# Patient Record
Sex: MALE | Race: WHITE | NOT HISPANIC OR LATINO | Employment: OTHER | URBAN - METROPOLITAN AREA
[De-identification: names, ages, dates, MRNs, and addresses within clinical notes are randomized per-mention and may not be internally consistent; named-entity substitution may affect disease eponyms.]

---

## 2018-08-26 ENCOUNTER — APPOINTMENT (OUTPATIENT)
Dept: CT IMAGING | Facility: HOSPITAL | Age: 73
End: 2018-08-26

## 2018-08-26 ENCOUNTER — APPOINTMENT (OUTPATIENT)
Dept: MRI IMAGING | Facility: HOSPITAL | Age: 73
End: 2018-08-26

## 2018-08-26 ENCOUNTER — HOSPITAL ENCOUNTER (EMERGENCY)
Facility: HOSPITAL | Age: 73
Discharge: HOME | End: 2018-08-26
Attending: EMERGENCY MEDICINE | Admitting: EMERGENCY MEDICINE

## 2018-08-26 VITALS
OXYGEN SATURATION: 93 % | RESPIRATION RATE: 16 BRPM | DIASTOLIC BLOOD PRESSURE: 72 MMHG | BODY MASS INDEX: 23.1 KG/M2 | HEIGHT: 74 IN | SYSTOLIC BLOOD PRESSURE: 132 MMHG | HEART RATE: 61 BPM | TEMPERATURE: 98.5 F | WEIGHT: 180 LBS

## 2018-08-26 DIAGNOSIS — R42 DIZZINESS: ICD-10-CM

## 2018-08-26 DIAGNOSIS — Z98.2 S/P VP SHUNT: ICD-10-CM

## 2018-08-26 DIAGNOSIS — Z86.79 HISTORY OF CORONARY ARTERY DISEASE: ICD-10-CM

## 2018-08-26 DIAGNOSIS — I10 ELEVATED BLOOD PRESSURE READING WITH DIAGNOSIS OF HYPERTENSION: ICD-10-CM

## 2018-08-26 DIAGNOSIS — R42 VERTIGO: Primary | ICD-10-CM

## 2018-08-26 LAB
ALBUMIN SERPL-MCNC: 4.43 G/DL (ref 3.2–4.8)
ALBUMIN/GLOB SERPL: 2 G/DL (ref 1.5–2.5)
ALP SERPL-CCNC: 34 U/L (ref 25–100)
ALT SERPL W P-5'-P-CCNC: 26 U/L (ref 7–40)
ANION GAP SERPL CALCULATED.3IONS-SCNC: 6 MMOL/L (ref 3–11)
AST SERPL-CCNC: 27 U/L (ref 0–33)
BASOPHILS # BLD AUTO: 0.04 10*3/MM3 (ref 0–0.2)
BASOPHILS NFR BLD AUTO: 0.4 % (ref 0–1)
BILIRUB SERPL-MCNC: 0.7 MG/DL (ref 0.3–1.2)
BILIRUB UR QL STRIP: NEGATIVE
BNP SERPL-MCNC: 101 PG/ML (ref 0–100)
BUN BLD-MCNC: 17 MG/DL (ref 9–23)
BUN/CREAT SERPL: 14.4 (ref 7–25)
CALCIUM SPEC-SCNC: 10 MG/DL (ref 8.7–10.4)
CHLORIDE SERPL-SCNC: 107 MMOL/L (ref 99–109)
CLARITY UR: CLEAR
CO2 SERPL-SCNC: 24 MMOL/L (ref 20–31)
COLOR UR: YELLOW
CREAT BLD-MCNC: 1.18 MG/DL (ref 0.6–1.3)
DEPRECATED RDW RBC AUTO: 50.1 FL (ref 37–54)
EOSINOPHIL # BLD AUTO: 0.11 10*3/MM3 (ref 0–0.3)
EOSINOPHIL NFR BLD AUTO: 1.1 % (ref 0–3)
ERYTHROCYTE [DISTWIDTH] IN BLOOD BY AUTOMATED COUNT: 13.3 % (ref 11.3–14.5)
GFR SERPL CREATININE-BSD FRML MDRD: 60 ML/MIN/1.73
GLOBULIN UR ELPH-MCNC: 2.3 GM/DL
GLUCOSE BLD-MCNC: 108 MG/DL (ref 70–100)
GLUCOSE UR STRIP-MCNC: NEGATIVE MG/DL
HCT VFR BLD AUTO: 40.1 % (ref 38.9–50.9)
HGB BLD-MCNC: 13.7 G/DL (ref 13.1–17.5)
HGB UR QL STRIP.AUTO: NEGATIVE
IMM GRANULOCYTES # BLD: 0.03 10*3/MM3 (ref 0–0.03)
IMM GRANULOCYTES NFR BLD: 0.3 % (ref 0–0.6)
KETONES UR QL STRIP: NEGATIVE
LEUKOCYTE ESTERASE UR QL STRIP.AUTO: NEGATIVE
LYMPHOCYTES # BLD AUTO: 1.88 10*3/MM3 (ref 0.6–4.8)
LYMPHOCYTES NFR BLD AUTO: 19.3 % (ref 24–44)
MCH RBC QN AUTO: 34.8 PG (ref 27–31)
MCHC RBC AUTO-ENTMCNC: 34.2 G/DL (ref 32–36)
MCV RBC AUTO: 101.8 FL (ref 80–99)
MONOCYTES # BLD AUTO: 1.07 10*3/MM3 (ref 0–1)
MONOCYTES NFR BLD AUTO: 11 % (ref 0–12)
NEUTROPHILS # BLD AUTO: 6.63 10*3/MM3 (ref 1.5–8.3)
NEUTROPHILS NFR BLD AUTO: 68.2 % (ref 41–71)
NITRITE UR QL STRIP: NEGATIVE
PH UR STRIP.AUTO: 7.5 [PH] (ref 5–8)
PLATELET # BLD AUTO: 207 10*3/MM3 (ref 150–450)
PMV BLD AUTO: 9.3 FL (ref 6–12)
POTASSIUM BLD-SCNC: 4.4 MMOL/L (ref 3.5–5.5)
PROT SERPL-MCNC: 6.7 G/DL (ref 5.7–8.2)
PROT UR QL STRIP: NEGATIVE
RBC # BLD AUTO: 3.94 10*6/MM3 (ref 4.2–5.76)
SODIUM BLD-SCNC: 137 MMOL/L (ref 132–146)
SP GR UR STRIP: 1.01 (ref 1–1.03)
TROPONIN I SERPL-MCNC: 0 NG/ML (ref 0–0.07)
UROBILINOGEN UR QL STRIP: NORMAL
WBC NRBC COR # BLD: 9.73 10*3/MM3 (ref 3.5–10.8)

## 2018-08-26 PROCEDURE — 99284 EMERGENCY DEPT VISIT MOD MDM: CPT

## 2018-08-26 PROCEDURE — 93005 ELECTROCARDIOGRAM TRACING: CPT

## 2018-08-26 PROCEDURE — 81003 URINALYSIS AUTO W/O SCOPE: CPT | Performed by: EMERGENCY MEDICINE

## 2018-08-26 PROCEDURE — 83880 ASSAY OF NATRIURETIC PEPTIDE: CPT | Performed by: EMERGENCY MEDICINE

## 2018-08-26 PROCEDURE — 70450 CT HEAD/BRAIN W/O DYE: CPT

## 2018-08-26 PROCEDURE — 70544 MR ANGIOGRAPHY HEAD W/O DYE: CPT

## 2018-08-26 PROCEDURE — 80053 COMPREHEN METABOLIC PANEL: CPT | Performed by: EMERGENCY MEDICINE

## 2018-08-26 PROCEDURE — 96374 THER/PROPH/DIAG INJ IV PUSH: CPT

## 2018-08-26 PROCEDURE — 85025 COMPLETE CBC W/AUTO DIFF WBC: CPT | Performed by: EMERGENCY MEDICINE

## 2018-08-26 PROCEDURE — 84484 ASSAY OF TROPONIN QUANT: CPT

## 2018-08-26 PROCEDURE — 93005 ELECTROCARDIOGRAM TRACING: CPT | Performed by: EMERGENCY MEDICINE

## 2018-08-26 PROCEDURE — 70548 MR ANGIOGRAPHY NECK W/DYE: CPT

## 2018-08-26 PROCEDURE — 70551 MRI BRAIN STEM W/O DYE: CPT

## 2018-08-26 PROCEDURE — A9577 INJ MULTIHANCE: HCPCS | Performed by: EMERGENCY MEDICINE

## 2018-08-26 PROCEDURE — 25010000002 LORAZEPAM PER 2 MG: Performed by: EMERGENCY MEDICINE

## 2018-08-26 PROCEDURE — 0 GADOBENATE DIMEGLUMINE 529 MG/ML SOLUTION: Performed by: EMERGENCY MEDICINE

## 2018-08-26 RX ORDER — NITROGLYCERIN 0.4 MG/1
0.4 TABLET SUBLINGUAL
COMMUNITY

## 2018-08-26 RX ORDER — LOSARTAN POTASSIUM 50 MG/1
100 TABLET ORAL DAILY
COMMUNITY

## 2018-08-26 RX ORDER — METOPROLOL TARTRATE 100 MG/1
100 TABLET ORAL 2 TIMES DAILY
COMMUNITY

## 2018-08-26 RX ORDER — CYCLOSPORINE 0.5 MG/ML
1 EMULSION OPHTHALMIC 2 TIMES DAILY
COMMUNITY

## 2018-08-26 RX ORDER — SILODOSIN 8 MG/1
CAPSULE ORAL
COMMUNITY

## 2018-08-26 RX ORDER — LORAZEPAM 2 MG/ML
1 INJECTION INTRAMUSCULAR ONCE
Status: COMPLETED | OUTPATIENT
Start: 2018-08-26 | End: 2018-08-26

## 2018-08-26 RX ORDER — POTASSIUM CHLORIDE 750 MG/1
10 TABLET, FILM COATED, EXTENDED RELEASE ORAL 2 TIMES DAILY
COMMUNITY

## 2018-08-26 RX ORDER — VITAMIN E 268 MG
400 CAPSULE ORAL DAILY
COMMUNITY

## 2018-08-26 RX ORDER — ASPIRIN 325 MG
325 TABLET ORAL DAILY
COMMUNITY

## 2018-08-26 RX ORDER — NIACIN 1000 MG/1
1000 TABLET, EXTENDED RELEASE ORAL NIGHTLY
COMMUNITY

## 2018-08-26 RX ORDER — LANSOPRAZOLE 30 MG/1
30 CAPSULE, DELAYED RELEASE ORAL DAILY
COMMUNITY

## 2018-08-26 RX ORDER — LORATADINE 10 MG/1
CAPSULE, LIQUID FILLED ORAL
COMMUNITY

## 2018-08-26 RX ORDER — SIMVASTATIN 40 MG
40 TABLET ORAL NIGHTLY
COMMUNITY

## 2018-08-26 RX ORDER — OMEGA-3-ACID ETHYL ESTERS 1 G/1
2 CAPSULE, LIQUID FILLED ORAL 2 TIMES DAILY
COMMUNITY

## 2018-08-26 RX ORDER — TRIAMTERENE AND HYDROCHLOROTHIAZIDE 75; 50 MG/1; MG/1
1 TABLET ORAL DAILY
COMMUNITY

## 2018-08-26 RX ORDER — EZETIMIBE 10 MG/1
10 TABLET ORAL DAILY
COMMUNITY

## 2018-08-26 RX ORDER — ALBUTEROL SULFATE 90 UG/1
2 AEROSOL, METERED RESPIRATORY (INHALATION) EVERY 4 HOURS PRN
COMMUNITY

## 2018-08-26 RX ORDER — MECLIZINE HYDROCHLORIDE 25 MG/1
25 TABLET ORAL 3 TIMES DAILY PRN
Qty: 21 TABLET | Refills: 0 | Status: SHIPPED | OUTPATIENT
Start: 2018-08-26

## 2018-08-26 RX ORDER — SODIUM CHLORIDE 0.9 % (FLUSH) 0.9 %
10 SYRINGE (ML) INJECTION AS NEEDED
Status: DISCONTINUED | OUTPATIENT
Start: 2018-08-26 | End: 2018-08-26 | Stop reason: HOSPADM

## 2018-08-26 RX ORDER — FINASTERIDE 5 MG/1
5 TABLET, FILM COATED ORAL DAILY
COMMUNITY

## 2018-08-26 RX ADMIN — LORAZEPAM 1 MG: 2 INJECTION INTRAMUSCULAR; INTRAVENOUS at 15:37

## 2018-08-26 RX ADMIN — GADOBENATE DIMEGLUMINE 15 ML: 529 INJECTION, SOLUTION INTRAVENOUS at 16:20

## 2018-08-28 ENCOUNTER — TELEPHONE (OUTPATIENT)
Dept: NEUROSURGERY | Facility: CLINIC | Age: 73
End: 2018-08-28

## 2018-08-28 NOTE — TELEPHONE ENCOUNTER
Provider:  Huseyin  Caller: Regis Bravo  Time of call:   9:35   Phone #:  [phone]  Surgery:  Shunt placement  Surgery Date:  10/08/12  Last visit:   02/27/15  Next visit: none scheduled    Reason for call:       (See alexia's message below)

## 2018-08-28 NOTE — TELEPHONE ENCOUNTER
Per the scan ordering history in EPIC they were ordered by Dr. Bernal (they are in EPIC).     Per ER notes they are sending the patient back to us just to check shunt settings, he can be scheduled with a PA

## 2018-08-28 NOTE — TELEPHONE ENCOUNTER
Pt called in to schedule a post ER visit with us.  It looks like we put in his Shunt in 2013 and they wanted to make sure that was evaluated after the MRI/MRA they did over the weekend.  Should the patient be seen in the office by a PA on a day that a physician is in the office?  Do we need to evaluate his brain scans as well?  Please advise.

## 2018-08-28 NOTE — TELEPHONE ENCOUNTER
Pt is aware to arrive tomorrow at 10am to fill out paperwork for us.  He is on your schedule at 11:00.  He asked about the scans and if you needed to review them.  Just a heads up.

## 2018-08-28 NOTE — TELEPHONE ENCOUNTER
Please make sure we have all patients appropriate prior surgical history/notes before his appt tomorrow.  It is still not clear why his current studies were ordered and if he was referred back to us for review and further recommendation. If someone else is following him with his studies and this appointment is just to check his shunt, that is ok.  We will still need previous documentation of previous shunt settings.

## 2018-08-28 NOTE — TELEPHONE ENCOUNTER
Do you know who ordered the new scans?     If new scans were ordered by our office, or referral to be seen by our office for new symptoms - then yes, he needs to come back to be seen while there is a physician present.     - however, just to check the shunt, he can be seen in the office by a PA.     Thanks,     SO

## 2018-08-29 ENCOUNTER — OFFICE VISIT (OUTPATIENT)
Dept: NEUROSURGERY | Facility: CLINIC | Age: 73
End: 2018-08-29

## 2018-08-29 VITALS
TEMPERATURE: 97.8 F | WEIGHT: 183.2 LBS | DIASTOLIC BLOOD PRESSURE: 74 MMHG | BODY MASS INDEX: 24.28 KG/M2 | HEIGHT: 73 IN | SYSTOLIC BLOOD PRESSURE: 110 MMHG

## 2018-08-29 DIAGNOSIS — G91.2 NORMAL PRESSURE HYDROCEPHALUS (HCC): ICD-10-CM

## 2018-08-29 DIAGNOSIS — G93.0 ARACHNOID CYST: ICD-10-CM

## 2018-08-29 DIAGNOSIS — Z98.2 VENTRICULOPLEURAL SHUNT STATUS: Primary | ICD-10-CM

## 2018-08-29 PROCEDURE — 99203 OFFICE O/P NEW LOW 30 MIN: CPT | Performed by: PHYSICIAN ASSISTANT

## 2018-08-29 NOTE — PROGRESS NOTES
Patient: Regis Bravo  : 1945  Chart #: [id number]    Date of Service: 2018    CHIEF COMPLAINT: Shunt check     History of Present Illness Mr. Bravo is a 73-year-old gentleman who is well known to our clinic.  He has a history of right craniotomy for fenestration of a very large arachnoid cyst with placement of blind ended ventricular catheter in the arachnoid cyst bed on 2011.  Postoperatively he developed some further gait difficulties and was felt to harbor a syndrome of normal pressure hydrocephalus.  On 10/8/2012 he underwent conversion of the blind-ended Vish reservoir into the arachnoid cyst to peritoneal shunt.  A Strata II valve was utilized and initially set at 1.5. Ultimately it was lowered to 0.5.  He has chronic gait difficulties.  Over the weekend he presented to the ED with complaints of dizziness. He said he felt like the room was spinning.  He had some MRIs and is here to have his shunt checked following the MRI.  In the ED he was treated with antivert and report doing much better. He denies dizziness today. No changes to his gait, focal weakness, confusion, or headache.  He says he has been doing very well.       Past Medical History:   Diagnosis Date   • Arachnoid cyst    • COPD (chronic obstructive pulmonary disease) (CMS/[facility])    • Coronary artery disease    • Hyperlipidemia    • Hypertension        Past Surgical History:   Procedure Laterality Date   • BRAIN SURGERY     • CARDIAC SURGERY         Social History     Social History   • Marital status:      Spouse name: N/A   • Number of children: N/A   • Years of education: N/A     Occupational History   • Not on file.     Social History Main Topics   • Smoking status: Current Every Day Smoker     Packs/day: 2.00     Types: Cigarettes   • Smokeless tobacco: Never Used   • Alcohol use 12.6 oz/week     21 Shots of liquor per week   • Drug use: No   • Sexual activity: Defer     Other Topics Concern   • Not on file      Social History Narrative   • No narrative on file         Current Outpatient Prescriptions:   •  albuterol (PROVENTIL HFA;VENTOLIN HFA) 108 (90 Base) MCG/ACT inhaler, Inhale 2 puffs Every 4 (Four) Hours As Needed for Wheezing., Disp: , Rfl:   •  aspirin 325 MG tablet, Take 325 mg by mouth Daily., Disp: , Rfl:   •  CALCIUM-VITAMIN D PO, Calcium-Vitamin D, Disp: , Rfl:   •  cycloSPORINE (RESTASIS) 0.05 % ophthalmic emulsion, 1 drop 2 (Two) Times a Day., Disp: , Rfl:   •  ezetimibe (ZETIA) 10 MG tablet, Take 10 mg by mouth Daily., Disp: , Rfl:   •  finasteride (PROSCAR) 5 MG tablet, Take 5 mg by mouth Daily., Disp: , Rfl:   •  FLUTICASONE PROPIONATE NA, fluticasone, Disp: , Rfl:   •  INDOMETHACIN PO, Take  by mouth., Disp: , Rfl:   •  lansoprazole (PREVACID) 30 MG capsule, Take 30 mg by mouth Daily., Disp: , Rfl:   •  Loratadine 10 MG capsule, Take  by mouth., Disp: , Rfl:   •  losartan (COZAAR) 50 MG tablet, Take 100 mg by mouth Daily., Disp: , Rfl:   •  meclizine (ANTIVERT) 25 MG tablet, Take 1 tablet by mouth 3 (Three) Times a Day As Needed for dizziness., Disp: 21 tablet, Rfl: 0  •  metoprolol tartrate (LOPRESSOR) 100 MG tablet, Take 100 mg by mouth 2 (Two) Times a Day., Disp: , Rfl:   •  Multiple Vitamins-Minerals (CENTRUM SILVER PO), Centrum Silver 0.4 mg-300 mcg-250 mcg tablet, Disp: , Rfl:   •  niacin (NIASPAN) 1000 MG CR tablet, Take 1,000 mg by mouth Every Night., Disp: , Rfl:   •  nitroglycerin (NITROSTAT) 0.4 MG SL tablet, Place 0.4 mg under the tongue Every 5 (Five) Minutes As Needed for Chest Pain. Take no more than 3 doses in 15 minutes., Disp: , Rfl:   •  omega-3 acid ethyl esters (LOVAZA) 1 g capsule, Take 2 g by mouth 2 (Two) Times a Day., Disp: , Rfl:   •  potassium chloride (K-DUR) 10 MEQ CR tablet, Take 10 mEq by mouth 2 (Two) Times a Day., Disp: , Rfl:   •  silodosin (RAPAFLO) 8 MG capsule capsule, Take  by mouth Daily With Breakfast., Disp: , Rfl:   •  simvastatin (ZOCOR) 40 MG tablet, Take  40 mg by mouth Every Night., Disp: , Rfl:   •  tiotropium (SPIRIVA) 18 MCG per inhalation capsule, Place 1 capsule into inhaler and inhale Daily., Disp: , Rfl:   •  triamterene-hydrochlorothiazide (MAXZIDE) 75-50 MG per tablet, Take 1 tablet by mouth Daily., Disp: , Rfl:   •  vitamin E 400 UNIT capsule, Take 400 Units by mouth Daily., Disp: , Rfl:         Review of Systems   Constitutional: Negative for activity change, appetite change, chills, diaphoresis, fatigue, fever and unexpected weight change.   HENT: Negative for congestion, dental problem, drooling, ear discharge, ear pain, facial swelling, hearing loss, mouth sores, nosebleeds, postnasal drip, rhinorrhea, sinus pressure, sneezing, sore throat, tinnitus, trouble swallowing and voice change.    Eyes: Negative for photophobia, pain, discharge, redness, itching and visual disturbance.   Respiratory: Negative for apnea, cough, choking, chest tightness, shortness of breath, wheezing and stridor.    Cardiovascular: Negative for chest pain, palpitations and leg swelling.   Gastrointestinal: Negative for abdominal distention, abdominal pain, anal bleeding, blood in stool, constipation, diarrhea, nausea, rectal pain and vomiting.   Endocrine: Negative for cold intolerance, heat intolerance, polydipsia, polyphagia and polyuria.   Genitourinary: Negative for decreased urine volume, difficulty urinating, dysuria, enuresis, flank pain, frequency, genital sores, hematuria and urgency.   Musculoskeletal: Negative for arthralgias, back pain, gait problem, joint swelling, myalgias, neck pain and neck stiffness.   Skin: Negative for color change, pallor, rash and wound.   Allergic/Immunologic: Negative for environmental allergies, food allergies and immunocompromised state.   Neurological: Positive for dizziness and light-headedness. Negative for tremors, seizures, syncope, facial asymmetry, speech difficulty, weakness, numbness and headaches.   Hematological: Negative for  "adenopathy. Does not bruise/bleed easily.   Psychiatric/Behavioral: Negative for agitation, behavioral problems, confusion, decreased concentration, dysphoric mood, hallucinations, self-injury, sleep disturbance and suicidal ideas. The patient is not nervous/anxious and is not hyperactive.    All other systems reviewed and are negative.      Objective   Vital Signs: Blood pressure 110/74, temperature 97.8 °F (36.6 °C), temperature source Temporal Artery , height 185.4 cm (73\"), weight 83.1 kg (183 lb 3.2 oz).  Physical Exam   Constitutional: He appears well-developed and well-nourished. No distress.   HENT:   Head: Normocephalic and atraumatic.   Eyes: Pupils are equal, round, and reactive to light. EOM are normal.   Cardiovascular: Normal rate and regular rhythm.    Pulmonary/Chest: Effort normal and breath sounds normal.   Psychiatric: He has a normal mood and affect. His behavior is normal. Thought content normal.   Nursing note and vitals reviewed.  Musculoskeletal:  Strength is intact in upper and lower extremities to direct testing.  Patient's gait is slightly shuffling. He uses a walker for assistance. He denies acute changes.   Neurologic:  Muscle tone is normal throughout.  Coordination is intact.  Sensation is intact to light touch throughout.  Patient is oriented to person, place, and time.  CRANIAL NERVES:  Cranial nerve II: Visual acuity intact.  Visual fields are intact.  Cranial nerves III, IV, and VI: Extraocular movements are intact without nystagmus.  Pupils are equal, round, and reactive to light and accommodation.  The eyelids elevate normal equal bilaterally.  Cranial nerve V: Jaw clenching is intact bilaterally.  Facial sensation is intact to light touch.   Cranial nerve VII: There is no facial asymmetry to direct testing of the muscles of facial expression.  Cranial nerve VIII: Hearing intact to finger rub bilaterally.  Balance is intact.  Cranial nerve IX and X: Uvula is midline.  The soft " palate rises symmetrically.  Voice is intact without hoarseness.   Cranial nerve XI: Shoulder shrug is intact.  Cranial nerve XII: Tongue is midline without atrophy or fasciculation.    Shunt was interrogated and pumps and refills with ease. It continues to be set at 0.5    Assessment/Plan    Diagnosis:  1. Normal pressure hydrocephalus  2. Arachnoid cyst    Medical Decision Making: Shunt setting is unchanged. Mr. Bravo is doing well. I will ask Dr. Mitchell to review his recent studies and I will call patient.  He may call our office if he has new or worsening symptoms.      There are no diagnoses linked to this encounter.           Lida Moore PA-C  Patient Care Team:  Dawood Cohen III, MD as PCP - General (Family Medicine)

## 2021-10-29 ENCOUNTER — TRANSCRIBE ORDERS (OUTPATIENT)
Dept: ADMINISTRATIVE | Facility: HOSPITAL | Age: 76
End: 2021-10-29

## 2021-10-29 DIAGNOSIS — R42 DIZZINESS AND GIDDINESS: Primary | ICD-10-CM

## 2021-11-19 ENCOUNTER — HOSPITAL ENCOUNTER (OUTPATIENT)
Dept: MRI IMAGING | Facility: HOSPITAL | Age: 76
Discharge: HOME | End: 2021-11-19
Admitting: FAMILY MEDICINE

## 2021-11-19 DIAGNOSIS — R42 DIZZINESS AND GIDDINESS: ICD-10-CM

## 2021-11-19 PROCEDURE — 70551 MRI BRAIN STEM W/O DYE: CPT

## 2022-02-09 ENCOUNTER — OFFICE VISIT (OUTPATIENT)
Dept: NEUROSURGERY | Facility: CLINIC | Age: 77
End: 2022-02-09

## 2022-02-09 VITALS — WEIGHT: 188.2 LBS | TEMPERATURE: 97.3 F | HEIGHT: 73 IN | BODY MASS INDEX: 24.94 KG/M2

## 2022-02-09 DIAGNOSIS — G91.2 NORMAL PRESSURE HYDROCEPHALUS: ICD-10-CM

## 2022-02-09 DIAGNOSIS — G93.0 ARACHNOID CYST: Primary | ICD-10-CM

## 2022-02-09 PROBLEM — Z72.0 TOBACCO USER: Status: ACTIVE | Noted: 2018-05-31

## 2022-02-09 PROBLEM — R97.20 RAISED PROSTATE SPECIFIC ANTIGEN: Status: ACTIVE | Noted: 2017-05-22

## 2022-02-09 PROBLEM — R10.9 RIGHT SIDED ABDOMINAL PAIN: Status: ACTIVE | Noted: 2020-03-05

## 2022-02-09 PROBLEM — E29.1 MALE HYPOGONADISM: Status: ACTIVE | Noted: 2020-10-08

## 2022-02-09 PROBLEM — I25.10 CORONARY ARTERIOSCLEROSIS: Status: ACTIVE | Noted: 2020-10-08

## 2022-02-09 PROBLEM — C61 ADENOCARCINOMA OF PROSTATE (HCC): Status: ACTIVE | Noted: 2021-06-17

## 2022-02-09 PROCEDURE — 99204 OFFICE O/P NEW MOD 45 MIN: CPT | Performed by: NEUROLOGICAL SURGERY

## 2022-02-09 RX ORDER — METHOCARBAMOL 500 MG/1
500 TABLET, FILM COATED ORAL 4 TIMES DAILY
COMMUNITY

## 2022-02-09 RX ORDER — HYDROCODONE BITARTRATE AND ACETAMINOPHEN 7.5; 325 MG/1; MG/1
1 TABLET ORAL DAILY
COMMUNITY
Start: 2022-01-25

## 2022-02-09 NOTE — PROGRESS NOTES
Patient: Regis Bravo  : 1945    Primary Care Provider: Dawood Cohen III, MD    Requesting Provider: As above        History    Chief Complaint: Dizziness and unsteady gait.    History of Present Illness: Mr. Bravo is a 76-year-old gentleman is well-known to my service.  He is known to have a huge frontotemporal arachnoid cyst.  In  he underwent craniotomy to fenestrate the cyst.  A blind-ended Rickham reservoir was left within the cyst cavity.  The following year this was converted to a  shunt.  At the end of last year he developed the above-noted symptoms.  They lasted for about a month and then receded.  He still has some slight unsteadiness of his gait.  He has had no headache, nausea, vomiting.    Review of Systems   Constitutional: Positive for chills. Negative for activity change, appetite change, diaphoresis, fatigue, fever and unexpected weight change.   HENT: Negative for congestion, dental problem, drooling, ear discharge, ear pain, facial swelling, hearing loss, mouth sores, nosebleeds, postnasal drip, rhinorrhea, sinus pressure, sneezing, sore throat, tinnitus, trouble swallowing and voice change.    Eyes: Positive for discharge. Negative for photophobia, pain, redness, itching and visual disturbance.   Respiratory: Positive for shortness of breath. Negative for apnea, cough, choking, chest tightness, wheezing and stridor.    Cardiovascular: Positive for leg swelling. Negative for chest pain and palpitations.   Gastrointestinal: Positive for constipation. Negative for abdominal distention, abdominal pain, anal bleeding, blood in stool, diarrhea, nausea, rectal pain and vomiting.   Endocrine: Negative for cold intolerance, heat intolerance, polydipsia, polyphagia and polyuria.   Genitourinary: Negative for decreased urine volume, difficulty urinating, dysuria, enuresis, flank pain, frequency, genital sores, hematuria and urgency.   Musculoskeletal: Positive for back pain and gait  "problem. Negative for arthralgias, joint swelling, myalgias, neck pain and neck stiffness.   Skin: Negative for color change, pallor, rash and wound.   Allergic/Immunologic: Negative for environmental allergies, food allergies and immunocompromised state.   Neurological: Positive for weakness. Negative for dizziness, tremors, seizures, syncope, facial asymmetry, speech difficulty, light-headedness, numbness and headaches.   Hematological: Negative for adenopathy. Bruises/bleeds easily.   Psychiatric/Behavioral: Negative for agitation, behavioral problems, confusion, decreased concentration, dysphoric mood, hallucinations, self-injury, sleep disturbance and suicidal ideas. The patient is not nervous/anxious and is not hyperactive.    All other systems reviewed and are negative.      The patient's past medical history, past surgical history, family history, and social history have been reviewed at length in the electronic medical record.    Physical Exam:   Temp 97.3 °F (36.3 °C) (Infrared)   Ht 185.4 cm (73\")   Wt 85.4 kg (188 lb 3.2 oz)   BMI 24.83 kg/m²   The patient's gait is somewhat shuffling and somewhat unsteady.  He tends to furniture walk.  There is no pronator drift.  He harbors some mild tremor within the fingers of his left hand.  CRANIAL NERVES:  Cranial Nerve II:  Visual fields are full to confrontation.  Cranial Nerve III, IV, and VI: PERRLADC. Extraocular movements are intact.  Nystagmus is not present.  Cranial Nerve V: Facial sensation is intact to light touch.  Cranial Nerve VII: Muscles of facial expression demonstate no weakness or asymmetry.  Cranial Nerve VIII: Hearing is intact to finger rub bilaterally.  Cranial Nerve IX and X: Palate elevates symmetrically.  Cranial Nerve XI: Shoulder shrug is intact bilaterally.  Cranial Nerve XII: Tongue is midline without evidence of atrophy or fasciculation.    Medical Decision Making    Data Review:   (All imaging studies were personally reviewed " unless stated otherwise)  Follow-up MRI of the brain dated 11/19/2021 is compared to a study from 8/26/2018.  There is no interval change.    Diagnosis:   Large arachnoid cyst, stable.    Treatment Options:   I interrogated the patient's shunt and it was set at 2.5.  This was reset to 1.5.  He will call with an update regarding his symptoms in about 2.5 weeks.  At this juncture I do not see anything that would require surgical intervention.       Diagnosis Plan   1. Arachnoid cyst     2. Normal pressure hydrocephalus (HCC)         Scribed for Juan Daniel Fontanez MD by Corrine Devlin CMA on 2/9/2022 13:24 EST       I, Dr. Fontanez, personally performed the services described in the documentation, as scribed in my presence, and it is both accurate and complete.

## 2022-03-01 ENCOUNTER — TELEPHONE (OUTPATIENT)
Dept: NEUROSURGERY | Facility: CLINIC | Age: 77
End: 2022-03-01

## 2022-03-01 NOTE — TELEPHONE ENCOUNTER
Provider:  Huseyin  Caller: patient  Time of call:   3:21  Phone #:  [phone]  Surgery:  na  Surgery Date:  na  Last visit:  2-9-2022   Next visit: ayse GANN:         Reason for call:  Patient called and said he was supposed to call with a update 2-21/2 weeks after his last office visit with Dr. Fontanez.       The patient said he was doing some better. His balance is still off a little but he said he was very careful. He does use a walker and a cane if he needs it when he is away from the house. Other than that he is doing pretty good. Please Advise. Thank you.

## 2022-03-01 NOTE — TELEPHONE ENCOUNTER
Provider:  Huseyin  Caller: Self   Time of call:   4:58 PM   Phone #:  [phone]  Surgery:    Surgery Date:    Last visit:  Office Visit with Juan Daniel Fontanez MD (02/09/2022)     Next visit: PRN         Reason for call:  Called pt and let him know that he doesn't need to do anything further and to contact the office with any further issues. Pt demonstrated understanding and was thankful for the call.

## 2024-10-14 ENCOUNTER — TELEPHONE (OUTPATIENT)
Dept: NEUROSURGERY | Facility: CLINIC | Age: 79
End: 2024-10-14
Payer: MEDICARE

## 2024-10-14 NOTE — TELEPHONE ENCOUNTER
Caller: DICK EDWARD    Relationship to patient: WIFE    Best call back number: [phone]    Chief complaint: PT HAD A FALL 10-1-24 AND WAS TAKEN TO THE [facility].  WIFE STATES HE HAS HAD A LOT OF CONFUSION AFTER THIS FALL.  SHE ALSO WANTED HIS SHUNT TO BE CHECKED.  I SCHEDULED AN APPT WITH FLOR REED, F/A WAS 11-22-24.  WANTED TO SEE IF THERE WAS A SOONER APPT.    PLEASE ADVISE.    Type of visit: FOLLOW UP EXT

## 2024-10-14 NOTE — TELEPHONE ENCOUNTER
Need hospital records from [facility]. I spoke to HIM Dept. She stated patient was admitted 10/2/24 thru 10/4/24. I have faxed a request for the discharge summary and any imaging of his head/brain - fax [phone].

## 2024-10-15 NOTE — TELEPHONE ENCOUNTER
Received hospital discharge summary and 2 head CT reports. I've scanned these records to patient's chart.     Please advise if patient needs to be evaluated for shunt check. (HUB scheduled patient to see Oscar COHEN on 11/22/24.)

## 2024-11-20 ENCOUNTER — TELEPHONE (OUTPATIENT)
Dept: NEUROSURGERY | Facility: CLINIC | Age: 79
End: 2024-11-20
Payer: MEDICARE

## 2024-11-20 NOTE — TELEPHONE ENCOUNTER
They need a clearance letter for the surgery scheduled NEXT WEEK. - 11/26    Follow up with us is in Jan. 2025.

## 2024-11-20 NOTE — TELEPHONE ENCOUNTER
Notified pt that surgery clearance will be addressed at time of appointment. Pt verbalized understanding and was thankful for the return call.

## 2024-11-20 NOTE — TELEPHONE ENCOUNTER
"Provider:  Huseyin  Surgery/Procedure:  \"huge frontotemporal arachnoid cyst.  In 2011 he underwent craniotomy to fenestrate the cyst.  A blind-ended Rickham reservoir was left within the cyst cavity.  The following year this was converted to a  shunt \"    Last visit:   Office Visit with Juan Daniel Fontanez MD (02/09/2022)     Next visit: 11/22/24 - Apro - THIS FRIDAY     Reason for call:    Roseline from [facility] surgery dept is requesting a neurosurgical clearance for pt to undergo a right elbow surgery which is scheduled for 11/26/24.      Please fax surgical clearance response to Attn: Roseline @ [phone]  "

## 2024-11-22 ENCOUNTER — OFFICE VISIT (OUTPATIENT)
Dept: NEUROSURGERY | Facility: CLINIC | Age: 79
End: 2024-11-22
Payer: MEDICARE

## 2024-11-22 VITALS
BODY MASS INDEX: 21.29 KG/M2 | WEIGHT: 160.6 LBS | TEMPERATURE: 96.6 F | DIASTOLIC BLOOD PRESSURE: 60 MMHG | SYSTOLIC BLOOD PRESSURE: 106 MMHG | HEIGHT: 73 IN

## 2024-11-22 DIAGNOSIS — G93.0 ARACHNOID CYST: Primary | ICD-10-CM

## 2024-11-22 PROCEDURE — 3078F DIAST BP <80 MM HG: CPT | Performed by: PHYSICIAN ASSISTANT

## 2024-11-22 PROCEDURE — 3074F SYST BP LT 130 MM HG: CPT | Performed by: PHYSICIAN ASSISTANT

## 2024-11-22 PROCEDURE — 99213 OFFICE O/P EST LOW 20 MIN: CPT | Performed by: PHYSICIAN ASSISTANT

## 2024-11-22 RX ORDER — TRAMADOL HYDROCHLORIDE 50 MG/1
50 TABLET ORAL
COMMUNITY

## 2024-11-22 RX ORDER — METOPROLOL SUCCINATE 100 MG/1
TABLET, EXTENDED RELEASE ORAL
COMMUNITY
Start: 2024-11-21

## 2024-11-22 RX ORDER — BUMETANIDE 0.5 MG/1
TABLET ORAL
COMMUNITY
Start: 2024-10-17

## 2024-11-22 RX ORDER — METOPROLOL SUCCINATE 50 MG/1
1 TABLET, EXTENDED RELEASE ORAL EVERY 12 HOURS SCHEDULED
COMMUNITY
Start: 2024-10-18

## 2024-11-22 RX ORDER — FOLIC ACID 1 MG/1
1 TABLET ORAL DAILY
COMMUNITY

## 2024-11-22 RX ORDER — SULFAMETHOXAZOLE AND TRIMETHOPRIM 800; 160 MG/1; MG/1
TABLET ORAL
COMMUNITY
Start: 2024-11-21

## 2024-11-22 RX ORDER — TIOTROPIUM BROMIDE 18 UG/1
18 CAPSULE ORAL; RESPIRATORY (INHALATION) DAILY
COMMUNITY

## 2024-11-22 RX ORDER — BUMETANIDE 1 MG/1
TABLET ORAL
COMMUNITY
Start: 2024-08-15

## 2024-11-22 RX ORDER — INDOMETHACIN 50 MG/1
50 CAPSULE ORAL
COMMUNITY

## 2024-11-22 RX ORDER — SACUBITRIL AND VALSARTAN 24; 26 MG/1; MG/1
1 TABLET, FILM COATED ORAL 2 TIMES DAILY
COMMUNITY

## 2024-11-22 RX ORDER — TIOTROPIUM BROMIDE INHALATION SPRAY 3.12 UG/1
2 SPRAY, METERED RESPIRATORY (INHALATION) DAILY
COMMUNITY
Start: 2024-11-09

## 2024-11-22 RX ORDER — APIXABAN 5 MG/1
5 TABLET, FILM COATED ORAL
COMMUNITY

## 2024-11-22 RX ORDER — POTASSIUM CHLORIDE 750 MG/1
2 CAPSULE, EXTENDED RELEASE ORAL DAILY
COMMUNITY
Start: 2024-10-15

## 2024-11-22 NOTE — PROGRESS NOTES
Patient: Regis Bravo  : 1945  Chart #: [id number]    Date of Service: 2024    CHIEF COMPLAINT: Confusion     History of Present Illness Mr Bravo is a kind 79 year old gentleman who is well-known to our service.  He has noted to have a huge frontotemporal arachnoid cyst.  In  he underwent craniotomy to fenestrate the cyst.  A blind ended Vish reservoir was left within the cyst cavity.  The following year this was converted to a  shunt.  When last seen the shunt was set at 1.5.  About 2 months ago he fell hitting his head.  He went to the ED at which time a CT was performed that demonstrated no acute abnormalities.  Patient has been doing well.  At times his wife feels like he is more forgetful.  He denies focal weakness or confusion.  No gait disturbance.      Past Medical History:   Diagnosis Date    Arachnoid cyst     COPD (chronic obstructive pulmonary disease)     Coronary artery disease     Hyperlipidemia     Hypertension     Prostate cancer          Current Outpatient Medications:     albuterol (PROVENTIL HFA;VENTOLIN HFA) 108 (90 Base) MCG/ACT inhaler, Inhale 2 puffs Every 4 (Four) Hours As Needed for Wheezing., Disp: , Rfl:     bumetanide (BUMEX) 0.5 MG tablet, Take 0.5mg by mouth every other day., Disp: , Rfl:     bumetanide (BUMEX) 1 MG tablet, , Disp: , Rfl:     CALCIUM-VITAMIN D PO, Calcium-Vitamin D, Disp: , Rfl:     Eliquis 5 MG tablet tablet, Take 1 tablet by mouth., Disp: , Rfl:     Entresto 24-26 MG tablet, Take 1 tablet by mouth 2 (Two) Times a Day., Disp: , Rfl:     finasteride (PROSCAR) 5 MG tablet, Take 1 tablet by mouth Daily., Disp: , Rfl:     FLUTICASONE PROPIONATE NA, fluticasone, Disp: , Rfl:     folic acid (FOLVITE) 1 MG tablet, Take 1 tablet by mouth Daily., Disp: , Rfl:     HYDROcodone-acetaminophen (NORCO) 7.5-325 MG per tablet, Take 1 tablet by mouth Daily., Disp: , Rfl:     indomethacin (INDOCIN) 50 MG capsule, Take 1 capsule by mouth., Disp: , Rfl:      lansoprazole (PREVACID) 30 MG capsule, Take 1 capsule by mouth Daily., Disp: , Rfl:     Loratadine 10 MG capsule, Take  by mouth., Disp: , Rfl:     meclizine (ANTIVERT) 25 MG tablet, Take 1 tablet by mouth 3 (Three) Times a Day As Needed for dizziness., Disp: 21 tablet, Rfl: 0    methocarbamol (ROBAXIN) 500 MG tablet, Take 1 tablet by mouth 4 (Four) Times a Day., Disp: , Rfl:     metoprolol succinate XL (TOPROL-XL) 100 MG 24 hr tablet, , Disp: , Rfl:     metoprolol succinate XL (TOPROL-XL) 50 MG 24 hr tablet, Take 1 tablet by mouth Every 12 (Twelve) Hours., Disp: , Rfl:     metoprolol tartrate (LOPRESSOR) 100 MG tablet, Take 1 tablet by mouth 2 (Two) Times a Day., Disp: , Rfl:     Multiple Vitamins-Minerals (CENTRUM SILVER PO), Centrum Silver 0.4 mg-300 mcg-250 mcg tablet, Disp: , Rfl:     niacin (NIASPAN) 1000 MG CR tablet, Take 1 tablet by mouth Every Night., Disp: , Rfl:     nitroglycerin (NITROSTAT) 0.4 MG SL tablet, Place 1 tablet under the tongue Every 5 (Five) Minutes As Needed for Chest Pain. Take no more than 3 doses in 15 minutes., Disp: , Rfl:     omega-3 acid ethyl esters (LOVAZA) 1 g capsule, Take 2 capsules by mouth 2 (Two) Times a Day., Disp: , Rfl:     potassium chloride (MICRO-K) 10 MEQ CR capsule, Take 2 capsules by mouth Daily., Disp: , Rfl:     silodosin (RAPAFLO) 8 MG capsule capsule, Take  by mouth Daily With Breakfast., Disp: , Rfl:     simvastatin (ZOCOR) 40 MG tablet, Take 1 tablet by mouth Every Night., Disp: , Rfl:     Spiriva Respimat 2.5 MCG/ACT aerosol solution inhaler, Inhale 2 puffs Daily., Disp: , Rfl:     sulfamethoxazole-trimethoprim (BACTRIM DS,SEPTRA DS) 800-160 MG per tablet, , Disp: , Rfl:     tiotropium (SPIRIVA) 18 MCG per inhalation capsule, Place 1 capsule into inhaler and inhale Daily., Disp: , Rfl:     traMADol (ULTRAM) 50 MG tablet, Take 1 tablet by mouth., Disp: , Rfl:     Past Surgical History:   Procedure Laterality Date    CARDIAC SURGERY      CRANIOTOMY Right  06/23/2011    Right craniotomy for fenestration of large arachnoid cyst with placement of blind ended ventricular catheter in the arachnoid cyst bed. --Dr. Nate Mitchell/Dr. Juan Daniel Fontanez    CRANIOTOMY  10/08/2012    conversion of the blind-ended Vish reservoir into the arachnoid cyst to peritoneal shunt. --Dr. Juan Daniel Fontanez       Social History     Socioeconomic History    Marital status:    Tobacco Use    Smoking status: Every Day     Current packs/day: 2.00     Types: Cigarettes    Smokeless tobacco: Never   Vaping Use    Vaping status: Never Used   Substance and Sexual Activity    Alcohol use: Yes     Alcohol/week: 21.0 standard drinks of alcohol     Types: 21 Shots of liquor per week    Drug use: No    Sexual activity: Defer         Review of Systems   Constitutional:  Negative for activity change, appetite change, chills, diaphoresis, fatigue, fever and unexpected weight change.   HENT:  Negative for congestion, dental problem, drooling, ear discharge, ear pain, facial swelling, hearing loss, mouth sores, nosebleeds, postnasal drip, rhinorrhea, sinus pressure, sinus pain, sneezing, sore throat, tinnitus, trouble swallowing and voice change.    Eyes:  Negative for photophobia, pain, discharge, redness, itching and visual disturbance.   Respiratory:  Negative for apnea, cough, choking, chest tightness, shortness of breath, wheezing and stridor.    Cardiovascular:  Negative for chest pain, palpitations and leg swelling.   Gastrointestinal:  Negative for abdominal distention, abdominal pain, anal bleeding, blood in stool, constipation, diarrhea, nausea, rectal pain and vomiting.   Endocrine: Negative for cold intolerance, heat intolerance, polydipsia, polyphagia and polyuria.   Genitourinary:  Negative for decreased urine volume, difficulty urinating, dysuria, enuresis, flank pain, frequency, genital sores, hematuria, penile discharge, penile pain, penile swelling, scrotal swelling, testicular pain and  "urgency.   Musculoskeletal:  Negative for arthralgias, back pain, gait problem, joint swelling, myalgias, neck pain and neck stiffness.   Skin:  Negative for color change, pallor, rash and wound.   Allergic/Immunologic: Negative for environmental allergies, food allergies and immunocompromised state.   Neurological:  Positive for dizziness. Negative for tremors, seizures, syncope, facial asymmetry, speech difficulty, weakness, light-headedness, numbness and headaches.   Hematological:  Negative for adenopathy. Does not bruise/bleed easily.   Psychiatric/Behavioral:  Positive for confusion. Negative for agitation, behavioral problems, decreased concentration, dysphoric mood, hallucinations, self-injury, sleep disturbance and suicidal ideas. The patient is not nervous/anxious and is not hyperactive.        Objective   Vital Signs: Blood pressure 106/60, temperature 96.6 °F (35.9 °C), temperature source Infrared, height 185.4 cm (73\"), weight 72.8 kg (160 lb 9.6 oz).  Physical Exam  Vitals and nursing note reviewed.   Constitutional:       General: He is not in acute distress.     Appearance: He is well-developed.   Psychiatric:         Behavior: Behavior normal.         Thought Content: Thought content normal.     Musculoskeletal:     Strength is intact in upper and lower extremities to direct testing.     Station and gait are normal.  Neurologic:     Sensation is intact to light touch throughout.     Patient is oriented to person, place, and time.       Strata valve was checked and continues to be set at 1.5    Assessment & Plan   Diagnosis: Large arachnoid cyst, stable    Medical Decision Making: Mr. Bravo is doing well.  His shunt continues to be set at 1.5.  There has been no interval change on his CT.  He will call our office if he has any acute neurologic changes.  Otherwise I will be happy to see him as needed.    There are no diagnoses linked to this encounter.                    BMI is within normal " parameters. No other follow-up for BMI required.         Lida Moore PA-C  Patient Care Team:  Giovani Caballero MD as PCP - General (Internal Medicine)  CohenDawood III, MD as Referring Physician (Family Medicine)

## 2024-11-25 ENCOUNTER — TELEPHONE (OUTPATIENT)
Dept: NEUROSURGERY | Facility: CLINIC | Age: 79
End: 2024-11-25
Payer: MEDICARE

## 2024-11-25 NOTE — TELEPHONE ENCOUNTER
Provider:  Oscar COHEN/ Huseyin VELEZ  Surgery/Procedure:   shunt placement  Surgery/Procedure Date:  2012  Last visit:  11/22/24  Next visit:      Reason for call:  Patient was seen on Fri. 11/22//24. His office note is not yet completed. Pre-op - Roseline - from [facility] called to get surgical clearance and to request a copy of his office note. Patient is scheduled to undergo right elbow surgery with Dr. Orellana tomorrow Tues. 11/25/24, at 10am.     They need to know if patient's intracranial pressure has or has not increased and that he is cleared surgically to undergo the right elbow surgery.    Roseline's callback number is [phone].